# Patient Record
Sex: FEMALE | Race: WHITE | NOT HISPANIC OR LATINO | URBAN - METROPOLITAN AREA
[De-identification: names, ages, dates, MRNs, and addresses within clinical notes are randomized per-mention and may not be internally consistent; named-entity substitution may affect disease eponyms.]

---

## 2018-12-10 ENCOUNTER — IMPORTED ENCOUNTER (OUTPATIENT)
Dept: URBAN - METROPOLITAN AREA CLINIC 50 | Facility: CLINIC | Age: 68
End: 2018-12-10

## 2019-03-11 ENCOUNTER — IMPORTED ENCOUNTER (OUTPATIENT)
Dept: URBAN - METROPOLITAN AREA CLINIC 50 | Facility: CLINIC | Age: 69
End: 2019-03-11

## 2020-01-21 ENCOUNTER — IMPORTED ENCOUNTER (OUTPATIENT)
Dept: URBAN - METROPOLITAN AREA CLINIC 50 | Facility: CLINIC | Age: 70
End: 2020-01-21

## 2021-01-20 ENCOUNTER — IMPORTED ENCOUNTER (OUTPATIENT)
Dept: URBAN - METROPOLITAN AREA CLINIC 50 | Facility: CLINIC | Age: 71
End: 2021-01-20

## 2021-04-18 ASSESSMENT — VISUAL ACUITY
OD_SC: 20/25
OS_SC: 20/20-
OD_SC: 20/25-
OD_OTHER: 20/30. 20/50.
OD_BAT: 20/50
OD_CC: J1+
OD_BAT: 20/30
OS_SC: 20/25+
OS_SC: 20/20
OD_CC: J1+
OS_BAT: 20/25
OS_OTHER: 20/25. 20/50.
OS_OTHER: 20/40. 20/50.
OD_CC: J1+
OS_CC: J1+
OS_SC: 20/20-
OD_SC: 20/20-1
OS_CC: J1+
OD_OTHER: 20/50. 20/70.
OS_BAT: 20/40
OD_SC: 20/25+
OS_CC: J1+
OS_CC: J1+
OD_CC: J1+

## 2021-04-18 ASSESSMENT — TONOMETRY
OD_IOP_MMHG: 12
OD_IOP_MMHG: 13
OS_IOP_MMHG: 14
OS_IOP_MMHG: 15
OD_IOP_MMHG: 15
OS_IOP_MMHG: 12
OD_IOP_MMHG: 14
OS_IOP_MMHG: 12

## 2022-01-14 ENCOUNTER — PREPPED CHART (OUTPATIENT)
Dept: URBAN - METROPOLITAN AREA CLINIC 49 | Facility: CLINIC | Age: 72
End: 2022-01-14

## 2022-01-19 ENCOUNTER — COMPREHENSIVE EXAM (OUTPATIENT)
Dept: URBAN - METROPOLITAN AREA CLINIC 49 | Facility: CLINIC | Age: 72
End: 2022-01-19

## 2022-01-19 DIAGNOSIS — H02.834: ICD-10-CM

## 2022-01-19 DIAGNOSIS — H35.363: ICD-10-CM

## 2022-01-19 DIAGNOSIS — H25.13: ICD-10-CM

## 2022-01-19 DIAGNOSIS — H02.831: ICD-10-CM

## 2022-01-19 PROCEDURE — 92134 CPTRZ OPH DX IMG PST SGM RTA: CPT

## 2022-01-19 PROCEDURE — 92014 COMPRE OPH EXAM EST PT 1/>: CPT

## 2022-01-19 ASSESSMENT — TONOMETRY
OD_IOP_MMHG: 15
OS_IOP_MMHG: 15

## 2022-01-19 ASSESSMENT — VISUAL ACUITY
OU_CC: J1+
OD_GLARE: 20/400
OD_GLARE: 20/200
OS_SC: 20/20
OD_SC: 20/20-2
OS_GLARE: 20/400
OS_GLARE: 20/100

## 2023-01-25 ENCOUNTER — COMPREHENSIVE EXAM (OUTPATIENT)
Dept: URBAN - METROPOLITAN AREA CLINIC 49 | Facility: CLINIC | Age: 73
End: 2023-01-25

## 2023-01-25 DIAGNOSIS — H02.834: ICD-10-CM

## 2023-01-25 DIAGNOSIS — H35.363: ICD-10-CM

## 2023-01-25 DIAGNOSIS — H02.831: ICD-10-CM

## 2023-01-25 DIAGNOSIS — H25.13: ICD-10-CM

## 2023-01-25 PROCEDURE — 92014 COMPRE OPH EXAM EST PT 1/>: CPT

## 2023-01-25 PROCEDURE — 92134 CPTRZ OPH DX IMG PST SGM RTA: CPT

## 2023-01-25 ASSESSMENT — TONOMETRY
OS_IOP_MMHG: 14
OD_IOP_MMHG: 15

## 2023-01-25 ASSESSMENT — VISUAL ACUITY
OU_CC: J1+ @ 16IN
OS_GLARE: 20/30
OS_GLARE: 20/60
OD_GLARE: 20/50
OD_GLARE: 20/30
OU_SC: 20/30
OS_SC: 20/30
OD_SC: 20/25

## 2023-01-25 NOTE — PATIENT DISCUSSION
Advised patient to push eating dark, leafy greens and egg yolks to push against macula degeneration.

## 2023-01-25 NOTE — PATIENT DISCUSSION
Will need Ptosis VF and external photos to confirm patient meets medical insurance criteria for blepharoplasty. Patient wishes to wait at this time.

## 2024-01-29 ENCOUNTER — COMPREHENSIVE EXAM (OUTPATIENT)
Dept: URBAN - METROPOLITAN AREA CLINIC 49 | Facility: LOCATION | Age: 74
End: 2024-01-29

## 2024-01-29 DIAGNOSIS — H02.834: ICD-10-CM

## 2024-01-29 DIAGNOSIS — H43.811: ICD-10-CM

## 2024-01-29 DIAGNOSIS — H35.363: ICD-10-CM

## 2024-01-29 DIAGNOSIS — H25.13: ICD-10-CM

## 2024-01-29 DIAGNOSIS — H02.831: ICD-10-CM

## 2024-01-29 PROCEDURE — 92134 CPTRZ OPH DX IMG PST SGM RTA: CPT

## 2024-01-29 PROCEDURE — 92014 COMPRE OPH EXAM EST PT 1/>: CPT

## 2024-01-29 PROCEDURE — 92015 DETERMINE REFRACTIVE STATE: CPT

## 2024-01-29 ASSESSMENT — VISUAL ACUITY
OU_CC: 20/25
OS_CC: 20/25-2
OD_CC: 20/25

## 2024-01-29 ASSESSMENT — TONOMETRY
OD_IOP_MMHG: 14
OS_IOP_MMHG: 14

## 2025-07-28 ENCOUNTER — COMPREHENSIVE EXAM (OUTPATIENT)
Age: 75
End: 2025-07-28

## 2025-07-28 DIAGNOSIS — H25.13: ICD-10-CM

## 2025-07-28 DIAGNOSIS — H35.363: ICD-10-CM

## 2025-07-28 DIAGNOSIS — B39.4: ICD-10-CM

## 2025-07-28 DIAGNOSIS — H02.834: ICD-10-CM

## 2025-07-28 DIAGNOSIS — H43.811: ICD-10-CM

## 2025-07-28 DIAGNOSIS — H02.831: ICD-10-CM

## 2025-07-28 PROCEDURE — 99214 OFFICE O/P EST MOD 30 MIN: CPT

## 2025-07-28 PROCEDURE — 92134 CPTRZ OPH DX IMG PST SGM RTA: CPT
